# Patient Record
Sex: MALE | Race: WHITE | Employment: OTHER | RURAL
[De-identification: names, ages, dates, MRNs, and addresses within clinical notes are randomized per-mention and may not be internally consistent; named-entity substitution may affect disease eponyms.]

---

## 2019-06-27 ENCOUNTER — APPOINTMENT (OUTPATIENT)
Dept: CT IMAGING | Facility: HOSPITAL | Age: 84
End: 2019-06-27
Payer: MEDICARE

## 2019-06-27 ENCOUNTER — HOSPITAL ENCOUNTER (EMERGENCY)
Facility: HOSPITAL | Age: 84
Discharge: HOME | End: 2019-06-27
Attending: HOSPITALIST
Payer: MEDICARE

## 2019-06-27 VITALS
DIASTOLIC BLOOD PRESSURE: 59 MMHG | RESPIRATION RATE: 16 BRPM | SYSTOLIC BLOOD PRESSURE: 154 MMHG | HEART RATE: 47 BPM | BODY MASS INDEX: 28.04 KG/M2 | WEIGHT: 207 LBS | OXYGEN SATURATION: 95 % | HEIGHT: 72 IN

## 2019-06-27 DIAGNOSIS — R40.4 TRANSIENT ALTERATION OF AWARENESS: Primary | ICD-10-CM

## 2019-06-27 DIAGNOSIS — E16.2 HYPOGLYCEMIA: ICD-10-CM

## 2019-06-27 LAB
ANION GAP SERPL CALCULATED.3IONS-SCNC: 11 MMOL/L (ref 3–16)
BASOPHILS ABSOLUTE: 0.1 K/UL (ref 0–0.1)
BASOPHILS RELATIVE PERCENT: 0.9 %
BUN BLDV-MCNC: 20 MG/DL (ref 6–20)
CALCIUM SERPL-MCNC: 9.3 MG/DL (ref 8.5–10.5)
CHLORIDE BLD-SCNC: 100 MMOL/L (ref 98–107)
CO2: 25 MMOL/L (ref 20–30)
CREAT SERPL-MCNC: 1.1 MG/DL (ref 0.4–1.2)
EOSINOPHILS ABSOLUTE: 0.4 K/UL (ref 0–0.4)
EOSINOPHILS RELATIVE PERCENT: 6.3 %
GFR AFRICAN AMERICAN: >59
GFR NON-AFRICAN AMERICAN: >59
GLUCOSE BLD-MCNC: 37 MG/DL (ref 74–106)
GLUCOSE BLD-MCNC: 73 MG/DL (ref 74–106)
GLUCOSE BLD-MCNC: 89 MG/DL (ref 74–106)
HCT VFR BLD CALC: 42.6 % (ref 40–54)
HEMOGLOBIN: 14 G/DL (ref 13–18)
IMMATURE GRANULOCYTES #: 0 K/UL
IMMATURE GRANULOCYTES %: 0.1 % (ref 0–5)
LYMPHOCYTES ABSOLUTE: 2.7 K/UL (ref 1.5–4)
LYMPHOCYTES RELATIVE PERCENT: 40.3 %
MCH RBC QN AUTO: 31.3 PG (ref 27–32)
MCHC RBC AUTO-ENTMCNC: 32.9 G/DL (ref 31–35)
MCV RBC AUTO: 95.1 FL (ref 80–100)
MONOCYTES ABSOLUTE: 0.7 K/UL (ref 0.2–0.8)
MONOCYTES RELATIVE PERCENT: 10.3 %
NEUTROPHILS ABSOLUTE: 2.9 K/UL (ref 2–7.5)
NEUTROPHILS RELATIVE PERCENT: 42.1 %
PDW BLD-RTO: 14.6 % (ref 11–16)
PERFORMED ON: ABNORMAL
PERFORMED ON: NORMAL
PLATELET # BLD: 246 K/UL (ref 150–400)
PMV BLD AUTO: 11.8 FL (ref 6–10)
POTASSIUM REFLEX MAGNESIUM: 4.2 MMOL/L (ref 3.4–5.1)
RBC # BLD: 4.48 M/UL (ref 4.5–6)
SODIUM BLD-SCNC: 136 MMOL/L (ref 136–145)
TROPONIN: <0.3 NG/ML
WBC # BLD: 6.8 K/UL (ref 4–11)

## 2019-06-27 PROCEDURE — 93005 ELECTROCARDIOGRAM TRACING: CPT

## 2019-06-27 PROCEDURE — 85025 COMPLETE CBC W/AUTO DIFF WBC: CPT

## 2019-06-27 PROCEDURE — 2580000003 HC RX 258: Performed by: HOSPITALIST

## 2019-06-27 PROCEDURE — 80048 BASIC METABOLIC PNL TOTAL CA: CPT

## 2019-06-27 PROCEDURE — 36415 COLL VENOUS BLD VENIPUNCTURE: CPT

## 2019-06-27 PROCEDURE — 99285 EMERGENCY DEPT VISIT HI MDM: CPT

## 2019-06-27 PROCEDURE — 70450 CT HEAD/BRAIN W/O DYE: CPT

## 2019-06-27 PROCEDURE — 84484 ASSAY OF TROPONIN QUANT: CPT

## 2019-06-27 PROCEDURE — 6370000000 HC RX 637 (ALT 250 FOR IP): Performed by: HOSPITALIST

## 2019-06-27 RX ORDER — NICOTINE POLACRILEX 4 MG
15 LOZENGE BUCCAL ONCE
Status: COMPLETED | OUTPATIENT
Start: 2019-06-27 | End: 2019-06-27

## 2019-06-27 RX ORDER — DICLOFENAC SODIUM 75 MG/1
75 TABLET, DELAYED RELEASE ORAL 2 TIMES DAILY
COMMUNITY

## 2019-06-27 RX ORDER — DEXTROSE MONOHYDRATE 25 G/50ML
50 INJECTION, SOLUTION INTRAVENOUS ONCE
Status: COMPLETED | OUTPATIENT
Start: 2019-06-27 | End: 2019-06-27

## 2019-06-27 RX ORDER — NICOTINE POLACRILEX 4 MG
LOZENGE BUCCAL
Status: DISPENSED
Start: 2019-06-27 | End: 2019-06-28

## 2019-06-27 RX ORDER — CYANOCOBALAMIN 1000 UG/ML
1000 INJECTION INTRAMUSCULAR; SUBCUTANEOUS
COMMUNITY

## 2019-06-27 RX ORDER — SILDENAFIL 100 MG/1
50 TABLET, FILM COATED ORAL PRN
COMMUNITY

## 2019-06-27 RX ORDER — HYDROCHLOROTHIAZIDE 25 MG/1
25 TABLET ORAL DAILY
COMMUNITY

## 2019-06-27 RX ORDER — LORATADINE AND PSEUDOEPHEDRINE 10; 240 MG/1; MG/1
1 TABLET, EXTENDED RELEASE ORAL DAILY
COMMUNITY

## 2019-06-27 RX ORDER — CLONIDINE HYDROCHLORIDE 0.1 MG/1
0.1 TABLET ORAL ONCE
Status: COMPLETED | OUTPATIENT
Start: 2019-06-27 | End: 2019-06-27

## 2019-06-27 RX ORDER — ENALAPRIL MALEATE 20 MG/1
20 TABLET ORAL 2 TIMES DAILY
COMMUNITY

## 2019-06-27 RX ORDER — LEVOTHYROXINE SODIUM 0.05 MG/1
50 TABLET ORAL DAILY
COMMUNITY

## 2019-06-27 RX ORDER — LOVASTATIN 20 MG/1
20 TABLET ORAL NIGHTLY
COMMUNITY

## 2019-06-27 RX ORDER — DOXAZOSIN 8 MG/1
8 TABLET ORAL NIGHTLY
COMMUNITY

## 2019-06-27 RX ADMIN — DEXTROSE MONOHYDRATE 50 ML: 25 INJECTION, SOLUTION INTRAVENOUS at 14:20

## 2019-06-27 RX ADMIN — CLONIDINE HYDROCHLORIDE 0.1 MG: 0.1 TABLET ORAL at 14:39

## 2019-06-27 RX ADMIN — DEXTROSE 15 G: 15 GEL ORAL at 14:15

## 2019-06-27 NOTE — ED PROVIDER NOTES
upcoming plan, treatment and possible disposition which they were agreeable to the time of this dictation. Patient advised initially that we would give him some IV dextrose I'm not sure exactly how much the conversation he retained is secondary to his confusion however after receiving the IV dextrose the patient is now alert awake and oriented ×4 he is able to provide history. Apparently the patient is here with his wife who is a patient on the floor. States he is diabetic and uses 70/30 insulin. Patient states he did use some insulin this morning but has not been eating appropriately while he is here. Patient states he was on her way to the vending machine to get a soda and a candy bar but apparently did not make it there he was found wandering the hallways a confused and unable to provide any information initially. Patient's blood pressure was elevated here upon arrival. We will give him some oral clonidine to see if this helps if not we will provide IV antihypertensive medication. Patient advised that we will also perform a CT scan ahead even though his blood sugar was 30 on the fingerstick since he did have altered mental status. We'll also check a CBC, BMP, troponin, UA, urine drug screen and a 12-lead EKG. Patient's final disposition be determined once his radiological diagnostic studies been performed and reviewed. Patient will also be provided a meal tray/family proximal here to make sure that he is a blood sugar does stay at an appropriate level. Blood work showed white count 6800, hemoglobin is 14.0, hemoglobin crit 42.6, platelet counts 403. Basic metabolic panel was benign except for glucose of 37. Troponin was nondetectable less than 0.30. Blood work showed white count 6800, hemoglobin is 14.0, heme crit 42.6, platelet counts 796. Basic metabolic panel was benign except for glucose of 37. Troponin was nondetectable less than 0.30.  Fingerstick glucose was 89    UA unable to provide a sample    Urine drug screen was not performed patient unable to provide a sample. CT scan ahead read by radiology as no acute intracranial abnormality. Age-related atrophy and chronic small vessel ischemic changes. The patient received IV dextrose and 8 here his blood sugar has remained steady. His blood pressure after the use the clonidine has been below 180 consistently. Patient does not have any symptoms at this time I do believe he can be discharged back home in stable condition. The patient will follow-up with their PCP in 1-2 days for reevaluation. She advised if symptoms worsens or new symptoms arise he should return back to emergency department for further evaluation workup. CONSULTS:  None    PROCEDURES:  Procedures    CRITICAL CARE TIME    Total Critical Care time was 0 minutes, excluding separately reportable procedures. There was a high probability of clinically significant/life threatening deterioration in the patient's condition which required my urgent intervention. FINAL IMPRESSION      1. Transient alteration of awareness    2. Hypoglycemia          DISPOSITION/PLAN   DISPOSITION        PATIENT REFERRED TO:  Your PCP  1-2 days for re-evaluation        [facility]  [facility] [address] [facility]  [phone]    As needed, If symptoms worsen      DISCHARGE MEDICATIONS:  New Prescriptions    No medications on file       Comment: Please note this report has been produced using speech recognition software and may contain errorsrelated to that system including errors in grammar, punctuation, and spelling, as well as words and phrases that may be inappropriate. If there are any questions or concerns please feel free to contact the dictating providerfor clarification.     Omar Greenfield DO  Attending Emergency Physician              Omar Greenfield DO  06/1935

## 2019-06-27 NOTE — ED TRIAGE NOTES
Pt was found disoriented next to vending machine by medical unit. Pt was placed in wheelchair and brought to ED. Pt was able to give his name but not his birthday, pt was given oral glucose and then after a few minutes was able to verbalize his birthday and became more coherent. Pt unable to give medication list or medical history at this time.